# Patient Record
Sex: FEMALE | Race: WHITE | NOT HISPANIC OR LATINO | Employment: UNEMPLOYED | URBAN - METROPOLITAN AREA
[De-identification: names, ages, dates, MRNs, and addresses within clinical notes are randomized per-mention and may not be internally consistent; named-entity substitution may affect disease eponyms.]

---

## 2023-11-24 ENCOUNTER — TELEPHONE (OUTPATIENT)
Age: 23
End: 2023-11-24

## 2023-11-24 NOTE — TELEPHONE ENCOUNTER
Theo Pierce ,   I see she will be a new patient , she can probably try something over the counter but that is all .

## 2023-11-24 NOTE — TELEPHONE ENCOUNTER
Pt mom called about daughters seborrheic dermatitis. She has seen other doctors who prescribed a medicated shampoo and antibiotics however the area is burning and also oozes.     I advised our next available appt is in February, she would like a call back to see if there is anything else her daughter could use    Please call pt mom back